# Patient Record
Sex: FEMALE | Race: BLACK OR AFRICAN AMERICAN | NOT HISPANIC OR LATINO | Employment: UNEMPLOYED | URBAN - METROPOLITAN AREA
[De-identification: names, ages, dates, MRNs, and addresses within clinical notes are randomized per-mention and may not be internally consistent; named-entity substitution may affect disease eponyms.]

---

## 2017-08-16 ENCOUNTER — HOSPITAL ENCOUNTER (EMERGENCY)
Facility: HOSPITAL | Age: 57
Discharge: HOME | End: 2017-08-16
Attending: EMERGENCY MEDICINE
Payer: OTHER GOVERNMENT

## 2017-08-16 VITALS
SYSTOLIC BLOOD PRESSURE: 148 MMHG | TEMPERATURE: 99 F | RESPIRATION RATE: 15 BRPM | BODY MASS INDEX: 25.61 KG/M2 | OXYGEN SATURATION: 98 % | HEART RATE: 70 BPM | HEIGHT: 64 IN | WEIGHT: 150 LBS | DIASTOLIC BLOOD PRESSURE: 81 MMHG

## 2017-08-16 DIAGNOSIS — R07.9 CHEST PAIN, UNSPECIFIED TYPE: ICD-10-CM

## 2017-08-16 DIAGNOSIS — F43.9 STRESS: ICD-10-CM

## 2017-08-16 DIAGNOSIS — R03.0 ELEVATED BLOOD PRESSURE READING: Primary | ICD-10-CM

## 2017-08-16 LAB
ALBUMIN SERPL BCP-MCNC: 3.6 G/DL
ALP SERPL-CCNC: 84 U/L
ALT SERPL W/O P-5'-P-CCNC: 17 U/L
ANION GAP SERPL CALC-SCNC: 10 MMOL/L
AST SERPL-CCNC: 14 U/L
BASOPHILS # BLD AUTO: 0.02 K/UL
BASOPHILS NFR BLD: 0.3 %
BILIRUB SERPL-MCNC: 0.3 MG/DL
BNP SERPL-MCNC: 35 PG/ML
BUN SERPL-MCNC: 16 MG/DL
CALCIUM SERPL-MCNC: 9.3 MG/DL
CHLORIDE SERPL-SCNC: 106 MMOL/L
CO2 SERPL-SCNC: 25 MMOL/L
CREAT SERPL-MCNC: 0.8 MG/DL
DIFFERENTIAL METHOD: ABNORMAL
EOSINOPHIL # BLD AUTO: 0.4 K/UL
EOSINOPHIL NFR BLD: 5.8 %
ERYTHROCYTE [DISTWIDTH] IN BLOOD BY AUTOMATED COUNT: 14.6 %
EST. GFR  (AFRICAN AMERICAN): >60 ML/MIN/1.73 M^2
EST. GFR  (NON AFRICAN AMERICAN): >60 ML/MIN/1.73 M^2
GLUCOSE SERPL-MCNC: 87 MG/DL
HCT VFR BLD AUTO: 41.2 %
HGB BLD-MCNC: 13.5 G/DL
INR PPP: 0.9
LYMPHOCYTES # BLD AUTO: 1.8 K/UL
LYMPHOCYTES NFR BLD: 26.7 %
MCH RBC QN AUTO: 30.2 PG
MCHC RBC AUTO-ENTMCNC: 32.8 G/DL
MCV RBC AUTO: 92 FL
MONOCYTES # BLD AUTO: 0.5 K/UL
MONOCYTES NFR BLD: 8 %
NEUTROPHILS # BLD AUTO: 4 K/UL
NEUTROPHILS NFR BLD: 59.2 %
PLATELET # BLD AUTO: 171 K/UL
PMV BLD AUTO: 12.7 FL
POTASSIUM SERPL-SCNC: 3.7 MMOL/L
PROT SERPL-MCNC: 7.1 G/DL
PROTHROMBIN TIME: 9.9 SEC
RBC # BLD AUTO: 4.47 M/UL
SODIUM SERPL-SCNC: 141 MMOL/L
TROPONIN I SERPL DL<=0.01 NG/ML-MCNC: <0.006 NG/ML
WBC # BLD AUTO: 6.73 K/UL

## 2017-08-16 PROCEDURE — 85025 COMPLETE CBC W/AUTO DIFF WBC: CPT

## 2017-08-16 PROCEDURE — 93005 ELECTROCARDIOGRAM TRACING: CPT

## 2017-08-16 PROCEDURE — 80053 COMPREHEN METABOLIC PANEL: CPT

## 2017-08-16 PROCEDURE — 93010 ELECTROCARDIOGRAM REPORT: CPT | Mod: ,,, | Performed by: INTERNAL MEDICINE

## 2017-08-16 PROCEDURE — 99284 EMERGENCY DEPT VISIT MOD MDM: CPT

## 2017-08-16 PROCEDURE — 85610 PROTHROMBIN TIME: CPT

## 2017-08-16 PROCEDURE — 83880 ASSAY OF NATRIURETIC PEPTIDE: CPT

## 2017-08-16 PROCEDURE — 84484 ASSAY OF TROPONIN QUANT: CPT

## 2017-08-16 RX ORDER — ASPIRIN 325 MG
325 TABLET ORAL DAILY
COMMUNITY

## 2017-08-16 RX ORDER — CITALOPRAM 20 MG/1
20 TABLET, FILM COATED ORAL DAILY
COMMUNITY

## 2017-08-16 RX ORDER — METOPROLOL SUCCINATE 25 MG/1
25 TABLET, EXTENDED RELEASE ORAL DAILY
COMMUNITY
End: 2017-08-16

## 2017-08-16 RX ORDER — AMLODIPINE BESYLATE 5 MG/1
5 TABLET ORAL DAILY
Qty: 30 TABLET | Refills: 0 | Status: SHIPPED | OUTPATIENT
Start: 2017-08-16

## 2017-08-16 RX ORDER — METOPROLOL SUCCINATE 25 MG/1
25 TABLET, EXTENDED RELEASE ORAL DAILY
Qty: 30 TABLET | Refills: 0 | Status: SHIPPED | OUTPATIENT
Start: 2017-08-16

## 2017-08-16 RX ORDER — AMLODIPINE BESYLATE 5 MG/1
5 TABLET ORAL DAILY
COMMUNITY
End: 2017-08-16

## 2017-08-16 RX ORDER — ERGOCALCIFEROL 1.25 MG/1
50000 CAPSULE ORAL
COMMUNITY

## 2017-08-16 NOTE — ED TRIAGE NOTES
Patient presented to the ED stating that she has a pain in her left arm that has been there for the last 2 days. Pt. Also stated having some chest pain. NAD noted upon assessment. Patient denies any NVD.

## 2017-08-16 NOTE — ED PROVIDER NOTES
Encounter Date: 8/16/2017    SCRIBE #1 NOTE: I, Roach Vale, am scribing for, and in the presence of,  Brandi Hay MD. I have scribed the following portions of the note - Other sections scribed: HPI, ROS, PE.       History     Chief Complaint   Patient presents with    Chest Pain     left arm.. states it radiates some to her left chest... patient states she thinks its a heart attack     CC: Chest Pain    HPI: This 57 y.o. Female smoker with a medical history of Hypertension presents to the ED c/o left-sided chest pain that radiates to left lateral neck through L shoulder shoulder and L arm was concerned symptoms were a possible heart attack. Symptoms began 3 days ago and pt attributes symptoms due to elevated stress from handling family matters and divorce. Pt denies history of similar symptoms. Pt denies any recent trauma. Pt is also c/o shortness of breath only while laying flat. Pt reports daily aspirin treatment. Pt denies history of blood clots in lungs or legs. Pt denies family history of MI. Pt is also requesting refill of 25 mg Metoprolol succinate for HTN. Pt denies any fever, nausea, dysuria, or any other associated symptoms. Pt has no modifying factors. Pt has no prior treatment.       The history is provided by the patient. No  was used.     Review of patient's allergies indicates:  No Known Allergies  Past Medical History:   Diagnosis Date    Hypertension      Past Surgical History:   Procedure Laterality Date    HYSTERECTOMY       History reviewed. No pertinent family history.  Social History   Substance Use Topics    Smoking status: Current Every Day Smoker     Years: 25.00     Types: Cigarettes    Smokeless tobacco: Never Used    Alcohol use Yes     Review of Systems   Constitutional: Negative for fever.   HENT: Negative for sore throat.    Respiratory: Positive for shortness of breath (w/ laying flat).    Cardiovascular: Positive for chest pain (Left-sided that  radiates to L lateral neck to L shoulder and L arm).   Gastrointestinal: Negative for nausea.   Genitourinary: Negative for dysuria.   Musculoskeletal: Positive for arthralgias (L shoulder), myalgias (L arm) and neck pain (L lateral). Negative for back pain.   Skin: Negative for rash.   Neurological: Negative for weakness.   Hematological: Does not bruise/bleed easily.       Physical Exam     Initial Vitals [08/16/17 1442]   BP Pulse Resp Temp SpO2   138/72 83 16 98.3 °F (36.8 °C) 98 %      MAP       94         Physical Exam    Nursing note and vitals reviewed.  Constitutional: She appears well-developed and well-nourished. She is cooperative.  Non-toxic appearance. No distress.   HENT:   Head: Normocephalic and atraumatic.   Mouth/Throat: Oropharynx is clear and moist.   Eyes: Conjunctivae and EOM are normal. Pupils are equal, round, and reactive to light.   Neck: Normal range of motion and full passive range of motion without pain. Neck supple. No thyromegaly present. No JVD present.   Cardiovascular: Normal rate, regular rhythm, normal heart sounds and normal pulses.   Pulmonary/Chest: Effort normal and breath sounds normal.   No respiratory distress.   Abdominal: Soft. Normal appearance and bowel sounds are normal. She exhibits no distension and no mass. There is no tenderness.   Musculoskeletal: Normal range of motion.    L para spinal cervical spasm  No calf tenderness.    Neurological: She is alert and oriented to person, place, and time. She has normal strength. No cranial nerve deficit or sensory deficit.   Skin: Skin is warm, dry and intact. No rash noted.   Psychiatric: She has a normal mood and affect. Her speech is normal and behavior is normal. Judgment and thought content normal.         ED Course   Procedures  Labs Reviewed   CBC W/ AUTO DIFFERENTIAL - Abnormal; Notable for the following:        Result Value    RDW 14.6 (*)     All other components within normal limits   COMPREHENSIVE METABOLIC PANEL  "  B-TYPE NATRIURETIC PEPTIDE   TROPONIN I   PROTIME-INR     EKG Readings: (Independently Interpreted)   Normal sinus rhythm, heart rate 84, normal axis, normal intervals, no STEMI       X-Rays:   Independently Interpreted Readings:   Chest X-Ray: Normal heart size.  No infiltrates.  No acute abnormalities.     Medical Decision Making:   Initial Assessment:   58 yo F w htn and hx of TIA here with chest pain and L UE "tightness". Pt reports onset at rest 3 days prior, denies sob or sputum production. No palpitations or hemoptysis. Pt is attributing her symptoms to recent life stressor including active divorce w . VS wnl, and exam without concerning findings, ekg non ischemic. No suspicion for PE at this time, Wells 0. Pt instructed to continue meds, follow w PCP and smoking cessation res/education given.   Clinical Tests:   Lab Tests: Ordered and Reviewed  Radiological Study: Ordered and Reviewed  Medical Tests: Ordered and Reviewed  ED Management:  Pt then requesting Tramadol, and I saw no need for narcotics, recommended stress reduction, massage and OTC Tylenol.             Scribe Attestation:   Scribe #1: I performed the above scribed service and the documentation accurately describes the services I performed. I attest to the accuracy of the note.    Attending Attestation:           Physician Attestation for Scribe:  Physician Attestation Statement for Scribe #1: I, Brandi Hay MD, reviewed documentation, as scribed by Doc Collazo in my presence, and it is both accurate and complete.                 ED Course     Clinical Impression:   The primary encounter diagnosis was Elevated blood pressure reading. Diagnoses of Chest pain, unspecified type and Stress were also pertinent to this visit.    Disposition:   Disposition: Discharged  Condition: Stable                        Brandi Hay MD  08/16/17 1829    "

## 2022-03-31 ENCOUNTER — HOSPITAL ENCOUNTER (EMERGENCY)
Facility: HOSPITAL | Age: 62
Discharge: HOME | End: 2022-03-31
Attending: EMERGENCY MEDICINE
Payer: OTHER GOVERNMENT

## 2022-03-31 VITALS
BODY MASS INDEX: 27.11 KG/M2 | WEIGHT: 153 LBS | HEART RATE: 78 BPM | RESPIRATION RATE: 18 BRPM | TEMPERATURE: 98 F | OXYGEN SATURATION: 99 % | DIASTOLIC BLOOD PRESSURE: 68 MMHG | HEIGHT: 63 IN | SYSTOLIC BLOOD PRESSURE: 141 MMHG

## 2022-03-31 DIAGNOSIS — R21 RASH AND NONSPECIFIC SKIN ERUPTION: Primary | ICD-10-CM

## 2022-03-31 PROCEDURE — 25000003 PHARM REV CODE 250: Performed by: PHYSICIAN ASSISTANT

## 2022-03-31 PROCEDURE — 99284 EMERGENCY DEPT VISIT MOD MDM: CPT

## 2022-03-31 PROCEDURE — 63600175 PHARM REV CODE 636 W HCPCS: Performed by: PHYSICIAN ASSISTANT

## 2022-03-31 RX ORDER — HYDROXYZINE HYDROCHLORIDE 25 MG/1
25 TABLET, FILM COATED ORAL EVERY 6 HOURS
Qty: 30 TABLET | Refills: 0 | Status: SHIPPED | OUTPATIENT
Start: 2022-03-31

## 2022-03-31 RX ORDER — ELECTROLYTES/DEXTROSE
SOLUTION, ORAL ORAL
Status: COMPLETED | OUTPATIENT
Start: 2022-03-31 | End: 2022-03-31

## 2022-03-31 RX ORDER — PREDNISONE 50 MG/1
50 TABLET ORAL DAILY
Qty: 7 TABLET | Refills: 0 | Status: SHIPPED | OUTPATIENT
Start: 2022-03-31 | End: 2022-04-07

## 2022-03-31 RX ORDER — PREDNISONE 20 MG/1
60 TABLET ORAL
Status: COMPLETED | OUTPATIENT
Start: 2022-03-31 | End: 2022-03-31

## 2022-03-31 RX ORDER — HYDROCORTISONE 1 %
CREAM (GRAM) TOPICAL
Qty: 30 G | Refills: 0 | Status: SHIPPED | OUTPATIENT
Start: 2022-03-31

## 2022-03-31 RX ADMIN — PREDNISONE 60 MG: 20 TABLET ORAL at 02:03

## 2022-03-31 RX ADMIN — HYDROCORTISONE: 0.01 CREAM TOPICAL at 02:03

## 2022-03-31 NOTE — ED PROVIDER NOTES
Encounter Date: 3/31/2022       History     Chief Complaint   Patient presents with    Allergic Reaction     Pt to triage with swelling, redness and itching to both arms, face and neck. Pt states the only thing out the ordinary is worker's painting and using chemicals in her house. Pt states it started yesterday and has gotten worse. Pt denies any problems to her throat, mouth or problems breathing. Pt denies CP.     Chief Complaint: Rash  History of  Present Illness: History obtained from patient. This 61 y.o. female who has past medical history of hypertension presents to the ED complaining of pruritic rash to the bilateral upper extremities, neck and face that have been gradually worsening since yesterday.  Patient states that her house currently being remodeled and the workers were sprain chemicals to strip the pain off of the walls.  Patient states that she may have, contact the chemical.  Denies difficulty swallowing, shortness of breath, cough, fever, chills, nausea, vomiting.  No new soaps, shampoos, lotions, detergents, medications or foods.  No prior treatment for symptoms.  No previous episodes.          Review of patient's allergies indicates:  No Known Allergies  Past Medical History:   Diagnosis Date    Hypertension      Past Surgical History:   Procedure Laterality Date    HYSTERECTOMY       No family history on file.  Social History     Tobacco Use    Smoking status: Current Every Day Smoker     Years: 25.00     Types: Cigarettes    Smokeless tobacco: Never Used   Substance Use Topics    Alcohol use: Yes    Drug use: No     Review of Systems   Constitutional: Negative for chills and fever.   HENT: Negative for congestion, rhinorrhea, sore throat and trouble swallowing.    Eyes: Negative for visual disturbance.   Respiratory: Negative for cough and shortness of breath.    Cardiovascular: Negative for chest pain.   Gastrointestinal: Negative for abdominal pain, diarrhea, nausea and vomiting.    Genitourinary: Negative for dysuria, frequency and hematuria.   Musculoskeletal: Negative for back pain.   Skin: Positive for rash.   Neurological: Negative for dizziness, weakness and headaches.       Physical Exam     Initial Vitals [03/31/22 1434]   BP Pulse Resp Temp SpO2   137/68 74 18 98.7 °F (37.1 °C) 96 %      MAP       --         Physical Exam    Nursing note and vitals reviewed.  Constitutional: She appears well-developed and well-nourished. She is active.  Non-toxic appearance. She does not have a sickly appearance. She does not appear ill.   HENT:   Head: Normocephalic and atraumatic.   Nose: Nose normal.   Mouth/Throat: Uvula is midline, oropharynx is clear and moist and mucous membranes are normal.   Eyes: Conjunctivae, EOM and lids are normal. Pupils are equal, round, and reactive to light.   Neck: Neck supple.   Normal range of motion.   Full passive range of motion without pain.     Cardiovascular: Normal rate and regular rhythm.   Pulmonary/Chest: No respiratory distress.   Musculoskeletal:      Cervical back: Full passive range of motion without pain, normal range of motion and neck supple.     Neurological: She is alert and oriented to person, place, and time.   Skin: Skin is warm. Capillary refill takes less than 2 seconds. Rash noted. Rash is urticarial.         ED Course   Procedures  Labs Reviewed - No data to display       Imaging Results    None          Medications   predniSONE tablet 60 mg (has no administration in time range)   hydrocortisone-aloe vera 1 % cream (has no administration in time range)     Medical Decision Making:   Differential Diagnosis:   Contact dermatitis, atopic dermatitis, drug eruption, anaphylaxis  ED Management:  This is an evaluation of a 61 y.o. female who presents to the ED for rash.  Vital signs are stable.   Afebrile.  Patient is nontoxic appearing and in no acute distress.  There are urticarial lesions to bilateral upper extremities, neck and face.   Posterior oropharynx is clear.  Uvula is midline.  Sublingual spaces are soft.  Patient able to speak in clear in full sentences.  No evidence of respiratory distress.    Given benign exam, I doubt acute process such as anaphylaxis or angioedema at this time.  Will treat patient symptomatically with prednisone, Atarax and hydrocortisone.    Patient given return precautions and instructed to return to the emergency department for any new or worsening symptoms. Patient verbalized understanding and agreed with plan. Encouraged follow-up with PCP.                        Clinical Impression:   Final diagnoses:  [R21] Rash and nonspecific skin eruption (Primary)          ED Disposition Condition    Discharge Stable        ED Prescriptions     Medication Sig Dispense Start Date End Date Auth. Provider    predniSONE (DELTASONE) 50 MG Tab Take 1 tablet (50 mg total) by mouth once daily. for 7 days 7 tablet 3/31/2022 4/7/2022 Tristan Collazo PA-C    hydrocortisone 1 % cream Apply to affected area 2 times daily 30 g 3/31/2022  Tristan Collazo PA-C    hydrOXYzine HCL (ATARAX) 25 MG tablet Take 1 tablet (25 mg total) by mouth every 6 (six) hours. 30 tablet 3/31/2022  Tristan Collazo PA-C        Follow-up Information     Follow up With Specialties Details Why Contact Info    Darien Beauchamp MD Family Medicine   [facility] [facility] [address] [phone]      [facility] Emergency Dept Emergency Medicine Go in 1 day If symptoms worsen [address]  [facility] [address] [phone]           Tristan Collazo PA-C  03/31/22 7400

## 2022-03-31 NOTE — ED TRIAGE NOTES
Pt presents to ED co of itchiness, swelling, and redness to arms that started today. Pt states the only thing new she has used or done is painting in her home. No SOB noted.

## 2023-09-29 ENCOUNTER — HOSPITAL ENCOUNTER (EMERGENCY)
Facility: HOSPITAL | Age: 63
Discharge: HOME | End: 2023-09-29
Attending: EMERGENCY MEDICINE
Payer: OTHER GOVERNMENT

## 2023-09-29 VITALS
WEIGHT: 150 LBS | OXYGEN SATURATION: 97 % | SYSTOLIC BLOOD PRESSURE: 148 MMHG | DIASTOLIC BLOOD PRESSURE: 92 MMHG | HEIGHT: 64 IN | BODY MASS INDEX: 25.61 KG/M2 | TEMPERATURE: 98 F | HEART RATE: 68 BPM | RESPIRATION RATE: 16 BRPM

## 2023-09-29 DIAGNOSIS — Z13.9 ENCOUNTER FOR MEDICAL SCREENING EXAMINATION: Primary | ICD-10-CM

## 2023-09-29 PROCEDURE — 99283 EMERGENCY DEPT VISIT LOW MDM: CPT

## 2023-09-29 NOTE — ED TRIAGE NOTES
"Pt presents to ED via [facility] with c/o altered mental status per . Pt's  told EMS pt was "found with a crack pipe". Pt reports drinking ETOH, denies drugs. Pt AAO x3.      "

## 2023-09-29 NOTE — DISCHARGE INSTRUCTIONS
Thank you for coming to our Emergency Department today. It is important to remember that some problems or medical conditions are difficult to diagnose and may not be found or addressed during your Emergency Department visit.  These conditions often start with non-specific symptoms and can only be diagnosed on follow up visits with your primary care physician or specialist when the symptoms continue or change. Please remember that all medical conditions can change, and we cannot predict how you will be feeling tomorrow or the next day.    Return to the ER with any questions/concerns, new/concerning symptoms, worsening, failure to improve or inability to obtain follow-up.       Be sure to follow up with your primary care doctor and review all labs/imaging/tests that were performed during your ER visit with them. It is very common for us to identify non-emergent incidental findings which must be followed up with your primary care physician.  Some labs/imaging/tests may be outside of the normal range, and require non-emergent follow-up and/or further investigation/treatment/procedures/testing to help diagnose/exclude/prevent complications or other potentially serious medical conditions. Some abnormalities may not have been discussed or addressed during your ER visit. Some lab results may not return during your ER visit but can be accessible by downloading the free [facility] Mychart satish or by visiting [email or web address] . It is important for you to review all labs/imaging/tests which are outside of the normal range with your physician.    An ER visit does not replace a primary care visit, and many screening tests or follow-up tests cannot be ordered by an ER doctor or performed by the ER. Some tests may even require pre-approval.    If you do not have a primary care doctor, you may contact the one listed on your discharge paperwork or you may also call the [facility] Appointment Desk at [phone] , or  [facility] at  [phone] to schedule an appointment, or establish care with a primary care doctor or even a specialist and to obtain information about local resources. It is important to your health that you have a primary care doctor.    Please take all medications as directed. We have done our best to select a medication for you that will treat your condition however, all medications may potentially have side-effects and it is impossible to predict which medications may give you side-effects or what those side-effects (if any) those medications may give you.  If you feel that you are having a negative effect or side-effect of any medication you should stop taking those medications immediately and seek medical attention. If you feel that you are having a life-threatening reaction call 161.        Do not drive, swim, climb to height, take a bath, operate heavy machinery, drink alcohol or take potentially sedating medications, sign any legal documents or make any important decisions for 24 hours if you have received any pain medications, sedatives or mood altering drugs during your ER visit or within 24 hours of taking them if they have been prescribed to you.     You can find additional resources for Dentists, hearing aids, durable medical equipment, low cost pharmacies and other resources at [email or web address]    MENTAL HEALTH/ADDICTIVE DISORDERS  REFERRAL RECOMMENDATIONS    CALL 988 FOR LOCAL MENTAL HEALTH AND ADDICTION RESOURCES    IN CASE OF SUICIDAL THINKING, call the National Suicide Hotline Number: 988    988 Suicide & Crisis Lifeline: 988 , 8-458-181-TALK (5376)  https://Kahnoodleline.org           AFTER VISIT INSTRUCTIONS:     [x] Take all medication, from all providers, as prescribed.  [x] If questions or concerns arise, or if experiencing side effects, adverse reactions or worsening symptoms, contact your provider through the [facility] portal at [email or web address], or call [phone] to reach  the [facility] main line.  [x] In cases of emergencies, call 911 or 988, or present to the emergency department for immediate assistance.    Information on mental health medications:    National [place] of Mental Health:   https://www.nimh.nih.gov/health/topics/mental-health-medications     Web MD:   https://www.webmd.Real Time Content       RESOURCES:     IN CASE OF SUICIDAL THINKING, call the National Suicide Hotline Number: 988    988 Suicide & Crisis Lifeline: 980 , 5-415-273-LDBW (4307)  Provides 24/7, free and confidential support for people in distress, prevention and crisis resources for you or your loved ones, and best practices for professionals.    Call, text or chat.  https://988Aureliantline.Âµ-[employer]     National Action [place] for Suicide Prevention: the National Action [place] for Suicide Prevention (Action [place]) is the nations public-private partnership for suicide prevention, working with more than 250 national partners to advance the.   https://theactionalliance.org     National Strategy for Suicide Prevention & Risk Mitigation:  https://theactionalliance.org/our-strategy/national-strategy-suicide-prevention     [x] Fact Sheet:   https://www.[facility].gov/sites/default/files/national-strategy-for-suicide-prevention-factsheet.pdf     [x] Report:   https://www.ncbi.nlm.nih.gov/books/TJN243737/pdf/Bookshelf_NBK109917.pdf     Suicide Prevention Resource Center: The Suicide Prevention Resource Center (SPR) is the only federally supported resource center devoted to advancing the implementation of the National Strategy for Suicide Prevention. [facility] is funded by the U.S. Department of Health and Human Services' Substance Abuse and Mental Health Services Administration (SAMHSA).  https://www.[facility].org     [x] Safety Plan:   https://bgg.11b.Erenis.Real Time Content/wp-content/uploads/2021/08/Jeanette-Safety-Plan-8-6-21.pdf     [x] Suicide Risk  Curve:  https://bgg.11b.LegalJump.com/wp-content/uploads/2021/08/Dgyecmg-ikcp-aoyze-8-6-21.pdf     [place] Mental Health Advocacy Service: the state agency tasked with protecting the legal rights of people with behavioral health diagnoses.  https://mhas.[place].[facility]     Alcoholics Anonymous (AA): find a meeting near you.  https://www.aa.org     SMI Adviser: resources for individuals and families with serious mental illness.  https://smiadviser.org     National [place] for the Mentally Ill (GREG): the nation's largest grassroots organization dedicated to building better lives for individuals with mental illness.  https://www.greg.org/Home     U.S. Department of Health and Human Services (HHS): the mission of HHS is to enhance the health and well-being of all Americans, by providing for effective health and human services and by fostering sound, sustained advances in the sciences underlying medicine, public health, and .   https://www.hhs.gov     Substance Abuse and Mental Health Services Administration (SAMHSA): SAMHSA is the agency within [facility] that leads public health efforts to advance the behavioral health of the nation. SAMHSA's mission is to reduce the impact of substance abuse and mental illness on [place]'s communities.   https://www.samhsa.gov     National Institutes of Health (NIH): a part of [facility], [facility] is the largest biomedical research agency in the world.   https://www.nih.gov     National Curtice on Drug Abuse (JEREMY): sponsored by the NIH, the mission of JEREMY is to advance science on drug use and addiction and to apply that knowledge to improve individual and public health.  https://jeremy.nih.gov     National Curtice on Alcohol Abuse and Alcoholism (NIAAA): sponsored by the NIH, the mission of NIAA is to generate and disseminate fundamental knowledge about the effects of alcohol on health and well-being, and apply that knowledge to improve diagnosis, prevention, and treatment of  alcohol-related problems, including alcohol use disorder, across the lifespan.   https://www.niaaa.nih.gov     National Harm Reduction Coalition: resources for harm reduction, including techniques, strategies, policy, and advocacy.  https://harmreduction.org     The SHARE Approach - A Model for Shared Decision Making:  [x] Fact Sheet  https://www.ahrq.gov/sites/default/files/publications/files/share-approach_factsheet.pdf     AMA Principles of Medical Ethics - Informed Consent & Shared Decision Making:  [x] Chapter  https://www.ama-assn.org/system/files/2019-06/code-of-medical-flxnvk-chomixu-1.pdf     Safety Netting for Primary Care:  [x] Article  https://www.ncbi.nlm.nih.gov/pmc/articles/JCC2362777/pdf/mcfwgfh-4771--e70.pdf     Local:   [facility]: http://[facility].org/wellness-and-behavioral-health-center    Other Places for Outpatient Addictive Disorders and Mental Health Treatment in [facility]:    [facility] ([place], [place], [place]; accepts Medicaid, commercial insurance) [phone]    [facility] ([place]) [phone], [address] [facility] [phone]; Crisis [phone] - Call for options A-E:    ? [facility], [address] [place] [address]    ? [facility], [address] [place] [address]    ? [facility], [address] [place] [address],    ? [facility], 2nd floor [address] [place] [address]    ? [facility], [address], [place] [address]    ? [facility], [address], [place] [address]    [facility], [address] [place] [address], [phone]    [facility], [place]/CASS [phone]    [facility] (Mental & General), [address] [place] [address], [phone]    [facility] (Mental &  General Health, not only HIV+, 3 CASS locations) [phone]    [facility], [address] [place], [place], [address], [phone]     [facility], [address] [facility] [address], [phone], [phone]    [facility] (Methadone Maintenance)    ? [address] [place] [address], [place] [address], [phone]    ? [place] [address] [phone]    IV. Addiction and Mental Health Treatment in Other [facility]:    [facility], [address] [place], [phone]    [facility], [phone], [address] [facility], [address], [phone]    [facility]. [address] [place] [address], [phone]    [facility], [address] [facility], [phone]    [facility]    ? [facility], [address] [facility], [phone] ([facility])    ? [facility], [address] [facility], [phone] ([facility])    ? [facility], [address] [facility], [address], [place], [phone] ([place], [place], and [facility])    ? [facility], [address], [place], [phone] ([facility])    [facility] HSA - Central Hotline [phone], [phone]    ? [facility], [address] [facility]    ? [facility], [address] [facility], [place]    ? [facility], [address] [facility], [place]    ? [facility], [address] [facility]. [address] [place]    ? [facility], [address] [facility]. [address], [place]    [facility], [address], [place] [phone]    [facility], [address] [facility] [phone]    [facility], [address] [place] [address]  [phone]    [facility] for [facility], [address] [facility], [phone]    V. Residential Addictive Disorders Treatment (call every day until you get in):    [facility] [address] [facility], [phone]    [facility] (men only) [address] [facility], [phone]    [facility], [address] [facility], mens program [phone], womens program [phone]    [facility], [address] [facility], [phone]    [facility] (Female only) [address] [facility], [phone]    [facility] (IOP, residential, low cost, MCaid) [address], [place], [phone]    [facility] (Men only, [phone]), [address] [facility], [place]    [facility] House (Pregnant/women with or without children, [phone])    [facility] [place] (Women only), [address] [facility], [phone]    [facility] (men only)[facility] [phone]    VI. Inpatient Rehabs (out of area)    [facility], [place], [phone]     [facility], [phone]    [facility], [phone]    [place], [place] [phone])    [place] ([facility]) [phone]    VII. In case of suicidal thinking, return to ED and/or call the COPE LINE [phone] / [phone]

## 2023-09-29 NOTE — ED PROVIDER NOTES
"Encounter Date: 9/29/2023       History     Chief Complaint   Patient presents with    Altered Mental Status     Here via [facility] with c/o resolved altered mental status,  activated EMS,  stated he saw patient "with a crack pipe", pt admits to ETOH use tonight, AMAURYO x3     63 y.o. female Past Medical History:  No date: Hypertension     EMS activated for pt with alleged bizarre behavior,  per EMS, her  had concern that she may have been on recreational drugs. Upon EMS arrival pt alert/oriented, admits to etoh use, denies recreational drug use. EMS reports normal mental status upon their arrival.    I have obtained collateral history from her  who states that she was "smoking crack" and "acting paranoid" earlier tonight. The patient denies this and states that they are in the process of getting a divorce.      Review of patient's allergies indicates:  No Known Allergies  Past Medical History:   Diagnosis Date    Hypertension      Past Surgical History:   Procedure Laterality Date    HYSTERECTOMY       No family history on file.  Social History     Tobacco Use    Smoking status: Every Day     Types: Cigarettes    Smokeless tobacco: Never   Substance Use Topics    Alcohol use: Yes    Drug use: No     Review of Systems   Constitutional:  Negative for fever.   HENT:  Negative for sore throat.    Respiratory:  Negative for shortness of breath.    Cardiovascular:  Negative for chest pain.   Gastrointestinal:  Negative for nausea.   Genitourinary:  Negative for dysuria.   Musculoskeletal:  Negative for back pain.   Skin:  Negative for rash.   Neurological:  Negative for weakness.   Hematological:  Does not bruise/bleed easily.   All other systems reviewed and are negative.      Physical Exam     Initial Vitals [09/29/23 0027]   BP Pulse Resp Temp SpO2   (!) 148/92 68 16 98 °F (36.7 °C) 97 %      MAP       --         Physical Exam    Nursing note and vitals reviewed.  Constitutional: She appears " well-developed and well-nourished.   HENT:   Head: Normocephalic and atraumatic.   Eyes: Conjunctivae and EOM are normal. Pupils are equal, round, and reactive to light.   Neck:   Normal range of motion.  Cardiovascular:  Normal rate.           Pulmonary/Chest: No respiratory distress.   Abdominal: She exhibits no distension.   Musculoskeletal:         General: Normal range of motion.      Cervical back: Normal range of motion.     Neurological: She is alert. No cranial nerve deficit. GCS score is 15. GCS eye subscore is 4. GCS verbal subscore is 5. GCS motor subscore is 6.   Skin: Skin is warm and dry.   Psychiatric: She has a normal mood and affect. Thought content normal.     Alert/oriented x 4  Ambulates with nl gait/balance.  Clinically not intoxicated at this time.  No si/hi  ED Course   Procedures  Labs Reviewed - No data to display       Imaging Results    None          Medications - No data to display  Medical Decision Making              Pt refusing to have any tests run. States she does not consent to any testing. I have d/w pts family that at this time she does not meet pec criteria, cannot be held against her will or obtain testing against her objections.    She ambulates independently with nl gait/balance without assistance.    Will discharge pt.               Clinical Impression:   Final diagnoses:  [Z13.9] Encounter for medical screening examination (Primary)        ED Disposition Condition    Discharge Stable          ED Prescriptions    None       Follow-up Information       Follow up With Specialties Details Why Contact Info    Darien Beauchamp MD Family Medicine   [address] [facility] [place] [address]  [phone]               Jb Mitchell-Lyle CABALLERO MD  09/29/23 0044